# Patient Record
(demographics unavailable — no encounter records)

---

## 2024-12-23 NOTE — HISTORY OF PRESENT ILLNESS
[FreeTextEntry1] : 55-year-old female with no significant past medical history comes in for evaluation of a cardiac murmur.  Overall, feels well denies chest pain shortness of breath PND orthopnea.

## 2024-12-23 NOTE — DISCUSSION/SUMMARY
[FreeTextEntry1] : 1.  Cardiac murmur: EKG reviewed normal sinus rhythm no significant changes from prior tracing.  Will obtain a echocardiogram and advise once results are known.  Will obtain blood work and advise once results are known.  Discussed need for screening colonoscopy as she is up-to-date for mammogram and Pap smear. [EKG obtained to assist in diagnosis and management of assessed problem(s)] : EKG obtained to assist in diagnosis and management of assessed problem(s)

## 2024-12-23 NOTE — PHYSICAL EXAM
[Well Developed] : well developed [No Acute Distress] : no acute distress [Normal Conjunctiva] : normal conjunctiva [Normal Venous Pressure] : normal venous pressure [No Carotid Bruit] : no carotid bruit [Normal S1, S2] : normal S1, S2 [No Rub] : no rub [No Gallop] : no gallop [Murmur] : murmur [Clear Lung Fields] : clear lung fields [Good Air Entry] : good air entry [No Respiratory Distress] : no respiratory distress  [Soft] : abdomen soft [Non Tender] : non-tender [No Masses/organomegaly] : no masses/organomegaly [Normal Bowel Sounds] : normal bowel sounds [Normal Gait] : normal gait [No Edema] : no edema [No Cyanosis] : no cyanosis [No Clubbing] : no clubbing [No Varicosities] : no varicosities [No Rash] : no rash [No Skin Lesions] : no skin lesions [Moves all extremities] : moves all extremities [No Focal Deficits] : no focal deficits [Normal Speech] : normal speech [Alert and Oriented] : alert and oriented [Normal memory] : normal memory [de-identified] : overweight [de-identified] : III/VI maulik LSB